# Patient Record
Sex: FEMALE | Race: WHITE | URBAN - METROPOLITAN AREA
[De-identification: names, ages, dates, MRNs, and addresses within clinical notes are randomized per-mention and may not be internally consistent; named-entity substitution may affect disease eponyms.]

---
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
## 2024-12-06 NOTE — PROCEDURE: ISOTRETINOIN MONITORING
Called pt. 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 States she is taking valacyclovir TID and advil as rx, but the rash is worsening and spreading. It is more painful  She is going to send a pic on LW now   Dosing Month 3 (Required For Cumulative Dosing): 60mg Daily